# Patient Record
Sex: FEMALE | Race: WHITE | NOT HISPANIC OR LATINO
[De-identification: names, ages, dates, MRNs, and addresses within clinical notes are randomized per-mention and may not be internally consistent; named-entity substitution may affect disease eponyms.]

---

## 2022-01-26 ENCOUNTER — TRANSCRIPTION ENCOUNTER (OUTPATIENT)
Age: 22
End: 2022-01-26

## 2022-05-31 PROBLEM — Z00.00 ENCOUNTER FOR PREVENTIVE HEALTH EXAMINATION: Status: ACTIVE | Noted: 2022-05-31

## 2022-07-19 ENCOUNTER — APPOINTMENT (OUTPATIENT)
Dept: OBGYN | Facility: CLINIC | Age: 22
End: 2022-07-19

## 2022-07-24 ENCOUNTER — NON-APPOINTMENT (OUTPATIENT)
Age: 22
End: 2022-07-24

## 2022-09-06 ENCOUNTER — APPOINTMENT (OUTPATIENT)
Dept: DERMATOLOGY | Facility: CLINIC | Age: 22
End: 2022-09-06

## 2022-10-11 ENCOUNTER — APPOINTMENT (OUTPATIENT)
Dept: OBGYN | Facility: CLINIC | Age: 22
End: 2022-10-11

## 2022-11-28 ENCOUNTER — APPOINTMENT (OUTPATIENT)
Dept: OBGYN | Facility: CLINIC | Age: 22
End: 2022-11-28

## 2023-02-02 ENCOUNTER — APPOINTMENT (OUTPATIENT)
Dept: DERMATOLOGY | Facility: CLINIC | Age: 23
End: 2023-02-02
Payer: COMMERCIAL

## 2023-02-02 PROCEDURE — 99203 OFFICE O/P NEW LOW 30 MIN: CPT

## 2023-03-07 ENCOUNTER — APPOINTMENT (OUTPATIENT)
Dept: OBGYN | Facility: CLINIC | Age: 23
End: 2023-03-07
Payer: COMMERCIAL

## 2023-03-07 ENCOUNTER — NON-APPOINTMENT (OUTPATIENT)
Age: 23
End: 2023-03-07

## 2023-03-07 VITALS
BODY MASS INDEX: 21.9 KG/M2 | HEIGHT: 62 IN | WEIGHT: 119 LBS | DIASTOLIC BLOOD PRESSURE: 68 MMHG | SYSTOLIC BLOOD PRESSURE: 110 MMHG

## 2023-03-07 DIAGNOSIS — Z78.9 OTHER SPECIFIED HEALTH STATUS: ICD-10-CM

## 2023-03-07 DIAGNOSIS — N92.1 EXCESSIVE AND FREQUENT MENSTRUATION WITH IRREGULAR CYCLE: ICD-10-CM

## 2023-03-07 PROCEDURE — 99385 PREV VISIT NEW AGE 18-39: CPT

## 2023-03-07 NOTE — PLAN
[FreeTextEntry1] : 23-year-old female for well woman exam\par \par 1.  Pap done with GC cultures\par 2.  Self breast exam teaching was done\par 3.  History of hypermenorrhea and menorrhagia.  The patient is maintained on Tri-Lo-Sammi.  Rx was provided x1 year.  All risk, benefits and potential complications of combined OCPs were reviewed with the patient.\par 4.  Annual exam in 1 year

## 2023-03-07 NOTE — HISTORY OF PRESENT ILLNESS
[FreeTextEntry1] : 23-year-old female presents for well woman exam.  She is a new patient to our practice.  Her last GYN exam was 1 year ago.  She has no complaints today.  Menarche was at the age of 9.  The patient is taking Tri-Lo-Sammi for cycle regulation and symptom control.  Prior to OCPs her menses were every 14 days, lasting for 5 days.  She does have a history of heavy menses.  She also admits to mild cramping and nausea with her cycles.  The patient states when she was in eighth grade her menses were so heavy that she became anemic, passed out and required a blood transfusion.  She also had a D&C at this time.  She was started on OCPs and has not had an issue since.  She has no significant GYN history.  She is sexually active.\par \par She has no significant past medical history.  Past surgical history includes a D&C.  Family history is noncontributory.  Social history is negative x3.  GYN history as above.  Nulligravida.  She is allergic to penicillin.  She takes Tri-Lo-Sammi.

## 2023-03-10 LAB
C TRACH RRNA SPEC QL NAA+PROBE: NOT DETECTED
N GONORRHOEA RRNA SPEC QL NAA+PROBE: NOT DETECTED
SOURCE TP AMPLIFICATION: NORMAL

## 2023-03-13 LAB — CYTOLOGY CVX/VAG DOC THIN PREP: NORMAL

## 2023-05-09 ENCOUNTER — NON-APPOINTMENT (OUTPATIENT)
Age: 23
End: 2023-05-09

## 2024-01-04 ENCOUNTER — APPOINTMENT (OUTPATIENT)
Dept: OBGYN | Facility: CLINIC | Age: 24
End: 2024-01-04
Payer: COMMERCIAL

## 2024-01-04 ENCOUNTER — TRANSCRIPTION ENCOUNTER (OUTPATIENT)
Age: 24
End: 2024-01-04

## 2024-01-04 VITALS
BODY MASS INDEX: 22.45 KG/M2 | SYSTOLIC BLOOD PRESSURE: 130 MMHG | HEIGHT: 62 IN | DIASTOLIC BLOOD PRESSURE: 86 MMHG | WEIGHT: 122 LBS

## 2024-01-04 DIAGNOSIS — N92.6 IRREGULAR MENSTRUATION, UNSPECIFIED: ICD-10-CM

## 2024-01-04 LAB — HCG UR QL: NEGATIVE

## 2024-01-04 PROCEDURE — 81025 URINE PREGNANCY TEST: CPT

## 2024-01-04 PROCEDURE — 99214 OFFICE O/P EST MOD 30 MIN: CPT

## 2024-01-04 NOTE — DISCUSSION/SUMMARY
[FreeTextEntry1] : vag sono cbc,hcg continue trilomarzia, is in the placebo week today , can restart today  but prefderrs to start on sunday

## 2024-01-04 NOTE — HISTORY OF PRESENT ILLNESS
[FreeTextEntry1] : 22 yo here because she had  a medical termination took first pills dec 7 and than dec 8 the second she started bleeding on the 8th and has been on and off bleeding since. 2 days ago she was bleeding a pad an hour.  SHe has a hx at age 11 of having non stop bleeding and had a d and c  .  SHe also has on and off anemia.  SHe has been on trilomarzia for the last 5 yrs did continuous insept and got preg after that. no missed pills  [Currently Active] : currently active [Men] : men [Vaginal] : vaginal [No] : No

## 2024-01-04 NOTE — PHYSICAL EXAM
[Chaperone Declined] : Patient declined chaperone [Appropriately responsive] : appropriately responsive [Alert] : alert [No Acute Distress] : no acute distress [Soft] : soft [Oriented x3] : oriented x3 [Labia Majora] : normal [Labia Minora] : normal [Scant] : There was scant vaginal bleeding [Normal] : normal [Uterine Adnexae] : normal

## 2024-01-08 ENCOUNTER — ASOB RESULT (OUTPATIENT)
Age: 24
End: 2024-01-08

## 2024-01-08 ENCOUNTER — APPOINTMENT (OUTPATIENT)
Dept: OBGYN | Facility: CLINIC | Age: 24
End: 2024-01-08
Payer: COMMERCIAL

## 2024-01-08 ENCOUNTER — APPOINTMENT (OUTPATIENT)
Dept: ANTEPARTUM | Facility: CLINIC | Age: 24
End: 2024-01-08
Payer: COMMERCIAL

## 2024-01-08 VITALS — WEIGHT: 122 LBS | HEIGHT: 62 IN | BODY MASS INDEX: 22.45 KG/M2

## 2024-01-08 PROCEDURE — 99214 OFFICE O/P EST MOD 30 MIN: CPT

## 2024-01-08 PROCEDURE — 76830 TRANSVAGINAL US NON-OB: CPT

## 2024-01-08 PROCEDURE — 76856 US EXAM PELVIC COMPLETE: CPT | Mod: 59

## 2024-01-08 NOTE — HISTORY OF PRESENT ILLNESS
[FreeTextEntry1] : 24 yo here to go over pelvic sono results. She had a medical termination and had heavy bleeding after, last night it lightened up to spotting. On Tri lo kendrick. Todays sono shows endometrial lining measures 3.17 with heterogenous area measuring 2.0 x 1.9 x .7 cm with color flow, suggestive of retained products of conception

## 2024-01-09 ENCOUNTER — APPOINTMENT (OUTPATIENT)
Dept: OBGYN | Facility: CLINIC | Age: 24
End: 2024-01-09
Payer: COMMERCIAL

## 2024-01-09 VITALS
WEIGHT: 122 LBS | HEIGHT: 62 IN | DIASTOLIC BLOOD PRESSURE: 83 MMHG | SYSTOLIC BLOOD PRESSURE: 121 MMHG | BODY MASS INDEX: 22.45 KG/M2

## 2024-01-09 DIAGNOSIS — O07.4 FAILED ATTEMPTED TERMINATION OF PREGNANCY W/OUT COMPLICATION: ICD-10-CM

## 2024-01-09 LAB
BASOPHILS # BLD AUTO: 0.06 K/UL
BASOPHILS NFR BLD AUTO: 1 %
EOSINOPHIL # BLD AUTO: 0.05 K/UL
EOSINOPHIL NFR BLD AUTO: 0.9 %
HCG SERPL-MCNC: 26 MIU/ML
HCT VFR BLD CALC: 43 %
HGB BLD-MCNC: 14 G/DL
IMM GRANULOCYTES NFR BLD AUTO: 0.2 %
LYMPHOCYTES # BLD AUTO: 1.87 K/UL
LYMPHOCYTES NFR BLD AUTO: 32.2 %
MAN DIFF?: NORMAL
MCHC RBC-ENTMCNC: 30.1 PG
MCHC RBC-ENTMCNC: 32.6 GM/DL
MCV RBC AUTO: 92.5 FL
MONOCYTES # BLD AUTO: 0.4 K/UL
MONOCYTES NFR BLD AUTO: 6.9 %
NEUTROPHILS # BLD AUTO: 3.41 K/UL
NEUTROPHILS NFR BLD AUTO: 58.8 %
PLATELET # BLD AUTO: 276 K/UL
RBC # BLD: 4.65 M/UL
RBC # FLD: 13.2 %
WBC # FLD AUTO: 5.8 K/UL

## 2024-01-09 PROCEDURE — 99214 OFFICE O/P EST MOD 30 MIN: CPT | Mod: 25

## 2024-01-11 ENCOUNTER — TRANSCRIPTION ENCOUNTER (OUTPATIENT)
Age: 24
End: 2024-01-11

## 2024-01-16 LAB — HCG SERPL-MCNC: 4 MIU/ML

## 2024-01-17 ENCOUNTER — ASOB RESULT (OUTPATIENT)
Age: 24
End: 2024-01-17

## 2024-01-17 ENCOUNTER — APPOINTMENT (OUTPATIENT)
Dept: OBGYN | Facility: CLINIC | Age: 24
End: 2024-01-17
Payer: COMMERCIAL

## 2024-01-17 ENCOUNTER — APPOINTMENT (OUTPATIENT)
Dept: ANTEPARTUM | Facility: CLINIC | Age: 24
End: 2024-01-17
Payer: COMMERCIAL

## 2024-01-17 VITALS
SYSTOLIC BLOOD PRESSURE: 117 MMHG | HEIGHT: 62 IN | DIASTOLIC BLOOD PRESSURE: 78 MMHG | WEIGHT: 122 LBS | BODY MASS INDEX: 22.45 KG/M2

## 2024-01-17 DIAGNOSIS — Z30.09 ENCOUNTER FOR OTHER GENERAL COUNSELING AND ADVICE ON CONTRACEPTION: ICD-10-CM

## 2024-01-17 PROCEDURE — 99214 OFFICE O/P EST MOD 30 MIN: CPT | Mod: 25

## 2024-01-17 PROCEDURE — 76857 US EXAM PELVIC LIMITED: CPT | Mod: 59

## 2024-01-17 PROCEDURE — 76830 TRANSVAGINAL US NON-OB: CPT

## 2024-01-17 NOTE — PLAN
[FreeTextEntry1] : 23-year-old female with  1.  Suspected retained products of conception.  Patient had a repeat sonogram done today.  Sonogram showed an endometrium of 6 mm.  There was a heterogeneous area measuring 1.5 x 1.5 cm with color flow suggestive of retained products of conception.  We discussed that this could potentially be necrotic retained products of conception as her beta hCG is now negative.  We discussed treatment options including D&C versus observation with repeat sonogram in 2 months.  Patient elects for repeat sonogram in 2 months.  She is not interested in having surgery at this time.  Call parameters were reviewed.  2.  Sonogram today also shows a left ovarian cyst of 3.7 cm in size which is simple.  We discussed that there is a high likelihood of spontaneous resolution of simple cysts.  The patient is taking Tri-Lo-Sammi and we discussed at her last visit changing to a higher dose of estrogen.  She was given a prescription for Tri Keiko but did not yet started it as she has not finished her pill pack.  We discussed that as she got pregnant and is making cysts on the Tri-Lo-Sammi she should start to try Keiko as her next pill pack.  Call parameters were reviewed.  The patient will return to the office in 8 weeks for a OCP check, well woman exam, and sonogram to check on most likely necrotic retained products of conception.  Again, call parameters were reviewed.  The patient was given the opportunity to ask questions and all were answered to her satisfaction.

## 2024-01-17 NOTE — HISTORY OF PRESENT ILLNESS
[FreeTextEntry1] : 23-year-old female presents for follow up visit.  Patient was seen last week after a medical termination of pregnancy.  The patient had an episode of heavy bleeding.  She had a sonogram that showed potential retained products of conception measuring 2 x 2 cm.  She was also sent for a beta hCG which was found to be 26.  The patient was counseled on her options at the last visit, Cytotec versus surgery.  She was interested in taking Cytotec.  The patient took vaginal Cytotec and states she did not have any bleeding.  She did have passage of a small piece of tissue 2 days after the administration of Cytotec.  Patient is here today for a follow-up sonogram and blood work.  Patient had beta-hCG done and the result was negative.  She is still having some brown spotting, but no heavy bleeding.  She did get pregnant on Tri-Lo-Sammi.  Menarche was at the age of 9.  The patient is taking Tri-Lo-Sammi for cycle regulation and symptom control.  Prior to OCPs her menses were every 14 days, lasting for 5 days.  She does have a history of heavy menses.  She also admits to mild cramping and nausea with her cycles.  The patient states when she was in eighth grade her menses were so heavy that she became anemic, passed out and required a blood transfusion.  She also had a D&C at this time.  She was started on OCPs and has not had an issue since.  She has no significant GYN history.  She is sexually active.  She has no significant past medical history.  Past surgical history includes a D&C.  Family history is noncontributory.  Social history is negative x3.  GYN history as above.  Nulligravida.  She is allergic to penicillin.  She takes Tri-Lo-Sammi.

## 2024-02-27 ENCOUNTER — APPOINTMENT (OUTPATIENT)
Dept: OBGYN | Facility: CLINIC | Age: 24
End: 2024-02-27

## 2024-03-13 ENCOUNTER — APPOINTMENT (OUTPATIENT)
Dept: ANTEPARTUM | Facility: CLINIC | Age: 24
End: 2024-03-13
Payer: COMMERCIAL

## 2024-03-13 ENCOUNTER — APPOINTMENT (OUTPATIENT)
Dept: OBGYN | Facility: CLINIC | Age: 24
End: 2024-03-13
Payer: COMMERCIAL

## 2024-03-13 ENCOUNTER — ASOB RESULT (OUTPATIENT)
Age: 24
End: 2024-03-13

## 2024-03-13 VITALS
BODY MASS INDEX: 23.19 KG/M2 | HEIGHT: 62 IN | WEIGHT: 126 LBS | SYSTOLIC BLOOD PRESSURE: 128 MMHG | DIASTOLIC BLOOD PRESSURE: 81 MMHG

## 2024-03-13 DIAGNOSIS — N83.209 UNSPECIFIED OVARIAN CYST, UNSPECIFIED SIDE: ICD-10-CM

## 2024-03-13 DIAGNOSIS — Z30.41 ENCOUNTER FOR SURVEILLANCE OF CONTRACEPTIVE PILLS: ICD-10-CM

## 2024-03-13 DIAGNOSIS — Z01.419 ENCOUNTER FOR GYNECOLOGICAL EXAMINATION (GENERAL) (ROUTINE) W/OUT ABNORMAL FINDINGS: ICD-10-CM

## 2024-03-13 PROCEDURE — 76830 TRANSVAGINAL US NON-OB: CPT

## 2024-03-13 PROCEDURE — 76856 US EXAM PELVIC COMPLETE: CPT | Mod: 59

## 2024-03-13 PROCEDURE — 99214 OFFICE O/P EST MOD 30 MIN: CPT | Mod: 25

## 2024-03-13 PROCEDURE — 99395 PREV VISIT EST AGE 18-39: CPT

## 2024-03-13 RX ORDER — NORGESTIMATE AND ETHINYL ESTRADIOL 7DAYSX3 LO
0.18/0.215/0.25 KIT ORAL DAILY
Qty: 3 | Refills: 1 | Status: COMPLETED | COMMUNITY
Start: 2023-03-07 | End: 2024-03-13

## 2024-03-13 RX ORDER — MISOPROSTOL 200 UG/1
200 TABLET ORAL
Qty: 8 | Refills: 0 | Status: COMPLETED | COMMUNITY
Start: 2024-01-09 | End: 2024-03-13

## 2024-03-13 RX ORDER — NORGESTIMATE AND ETHINYL ESTRADIOL 7DAYSX3 28
0.18/0.215/0.25 KIT ORAL DAILY
Qty: 3 | Refills: 3 | Status: ACTIVE | COMMUNITY
Start: 2024-01-09 | End: 1900-01-01

## 2024-03-13 NOTE — PHYSICAL EXAM
[Chaperone Declined] : Patient declined chaperone [Appropriately responsive] : appropriately responsive [Alert] : alert [No Acute Distress] : no acute distress [No Lymphadenopathy] : no lymphadenopathy [No Murmurs] : no murmurs [Regular Rate Rhythm] : regular rate rhythm [Clear to Auscultation B/L] : clear to auscultation bilaterally [Non-tender] : non-tender [Soft] : soft [Non-distended] : non-distended [No HSM] : No HSM [No Lesions] : no lesions [No Mass] : no mass [Oriented x3] : oriented x3 [Examination Of The Breasts] : a normal appearance [No Masses] : no breast masses were palpable [Labia Majora] : normal [Labia Minora] : normal [Uterine Adnexae] : normal [Normal] : normal

## 2024-03-13 NOTE — PLAN
[FreeTextEntry1] : 24-year-old female for well woman exam  1.  Pap done with GC cultures 2.  Self breast exam teaching was done 3.  History of hypermenorrhea and menorrhagia.  The patient got pregnant on Tri-Lo-Sammi.  She has been on Tri-Sprintec now for 8 weeks.  Overall, she is happy with the pill.  The patient has noticed more hormonal acne with this pill but would like to continue for now.  She states if after 6 months she is still having hormonal acne she will return to the office to discuss other OCP options.  We discussed starting Ocella if acne continue on Tri-Sprintec.  Rx was provided for Tri-Sprintec x 1 year.  All risk, benefits and potential complications of combined OCPs were reviewed with the patient. 4.  History of medical termination of pregnancy with suspected necrotic retained products of conception.  Sonogram was performed today.  Area of retained products of conception no longer visualized.  Patient was counseled.  Reassurance was provided. 5.  History of simple left ovarian cyst.  Sonogram was performed today.  Cyst resolved. 6.  Annual exam in 1 year

## 2024-03-13 NOTE — HISTORY OF PRESENT ILLNESS
[FreeTextEntry1] : 24-year-old female presents for well woman exam, OCP check and sono results.  Menarche was at the age of 9.  The patient is taking Tri-Sprintecfor cycle regulation and symptom control.  Prior to OCPs her menses were every 14 days, lasting for 5 days.  She does have a history of heavy menses.  She also admits to mild cramping and nausea with her cycles.  The patient states when she was in eighth grade her menses were so heavy that she became anemic, passed out and required a blood transfusion.  She also had a D&C at this time.  She was started on OCPs and has not had an issue since.  She has been on the Tri-Sprintec for 8 weeks as she got pregnant on Tri-Lo-Sammi.  She states overall she is happy with the Tri-Sprintec but has noticed an increase in her acne.  She is not interested in changing her pill at this time.  She would like to stay on this pill for 6 months to see if her acne improves before changing OCPs.  Patient is also here for a sonogram today.  The patient had a medical termination of pregnancy in 2024.  The patient had a sonogram that showed potential retained products of conception measuring 2 x 2 cm in size.  Her last beta-hCG was negative.  She took Cytotec but did not have any bleeding.  She did pass a small amount of tissue 2 days after the Cytotec.  She had a follow-up sonogram that showed a 1.5 x 1.5 cm endometrial mass suggestive of retained products of conception.  We had discussed at this time that this could be necrotic retained products of conception.  Sonogram also showed a 3.7 cm left simple ovarian cyst.  She denies any pelvic pain or pressure.  She denies any intermenstrual bleeding.  She has no significant GYN history.  She is sexually active.  She has no significant past medical history.  Past surgical history includes a D&C.  Family history is noncontributory.  Social history is negative x3.  GYN history as above.   (medical etop).  She is allergic to penicillin.  She takes Tri-Sprintec.

## 2024-03-18 PROBLEM — O07.4 RETAINED PRODUCTS OF CONCEPTION AFTER INDUCED TERMINATION OF PREGNANCY: Status: ACTIVE | Noted: 2024-01-08

## 2024-03-18 LAB — CYTOLOGY CVX/VAG DOC THIN PREP: NORMAL

## 2024-03-18 NOTE — PLAN
[FreeTextEntry1] : 24-year-old female with  1.  History of medical termination of pregnancy with possible retained products of conception.  Blood work and sonogram were reviewed with the patient.  Patient is aware that she is not anemic.  Beta hCG was 26.  Sonogram showing 2 cm heterogeneous area in the uterus suggestive of retained products of conception.  The patient has not had any further heavy bleeding.  We discussed treatment options including observation versus medical treatment with Cytotec versus D&C.  All risk, benefits and potential complications of each option were reviewed carefully with the patient.  The patient is interested in taking Cytotec.  Rx was provided for Cytotec 800 mcg to be used vaginally every 3 hours for 2 doses as needed.  Patient is aware that she can get bleeding after administration of Cytotec.  Strict call parameters were reviewed.  We also discussed repeating her beta-hCG in 1 week with return to the office for a transvaginal sonogram and visit to see if the retained products of conception have passed.  Patient is aware that she can take NSAIDs as needed for pain.  2.  We addressed the fact that the patient to get pregnant on Tri-Lo-Sammi.  The patient was not missing any pills or taking them late.  Discussed with the patient that she may need a higher dose of estrogen.  She is interested in resuming combined OCPs with her next menstrual cycle.  Rx was given for Tri-Sprintec x 3 months.  All risk, benefits and potential complications of combined OCPs were reviewed with the patient.  She will start this medication with her next menstrual cycle.  Instructions were reviewed.  Counseling was given.  The patient was given the opportunity to ask questions and all were answered to her satisfaction.

## 2024-03-18 NOTE — HISTORY OF PRESENT ILLNESS
[FreeTextEntry1] : 23-year-old female presents for follow up visit.  Patient was taking Tri-Lo-Sammi for contraception and got pregnant on the pill.  The patient had an elective termination of pregnancy with [facility].  The patient had a sonogram prior to taking medication showing an intrauterine pregnancy measuring 6+ weeks.  Patient took the medication in December 7 and December 8.  The patient did have heavy bleeding which then turned into spotting.  Patient presented to our office on January fourth for an episode of heavy vaginal bleeding.  She states send she is just had spotting.  She denies any symptoms of anemia.  The patient had a CBC which was within normal limits.  Beta hCG was drawn which was 26.  She had a transvaginal sonogram that showed a millimeter endometrial lining with 2 cm intrauterine vascularity which could be consistent with retained products of conception.  She is here today to discuss treatment options.  She denies any further episodes of heavy vaginal bleeding.  She denies cramping.  Menarche was at the age of 9.  Prior to OCPs her menses were every 14 days, lasting for 5 days.  She does have a history of heavy menses.  She also admits to mild cramping and nausea with her cycles.  The patient states when she was in eighth grade her menses were so heavy that she became anemic, passed out and required a blood transfusion.  She also had a D&C at this time.  She was started on OCPs and has not had an issue since.  She has no significant GYN history.  She is sexually active.  She has no significant past medical history.  Past surgical history includes a D&C.  Family history is noncontributory.  Social history is negative x3.  GYN history as above.  Nulligravida.  She is allergic to penicillin.  She takes Tri-Lo-Sammi.

## 2024-03-18 NOTE — PHYSICAL EXAM
[Chaperone Declined] : Patient declined chaperone [Appropriately responsive] : appropriately responsive [Alert] : alert [No Acute Distress] : no acute distress [Soft] : soft [Non-tender] : non-tender [Non-distended] : non-distended [No HSM] : No HSM [No Lesions] : no lesions [No Mass] : no mass [Oriented x3] : oriented x3 [Labia Majora] : normal [Labia Minora] : normal [Uterine Adnexae] : normal [Normal] : normal

## 2024-03-19 ENCOUNTER — APPOINTMENT (OUTPATIENT)
Dept: OBGYN | Facility: CLINIC | Age: 24
End: 2024-03-19

## 2024-08-08 ENCOUNTER — APPOINTMENT (OUTPATIENT)
Dept: DERMATOLOGY | Facility: CLINIC | Age: 24
End: 2024-08-08

## 2025-06-03 ENCOUNTER — NON-APPOINTMENT (OUTPATIENT)
Age: 25
End: 2025-06-03

## 2025-06-05 ENCOUNTER — APPOINTMENT (OUTPATIENT)
Dept: DERMATOLOGY | Facility: CLINIC | Age: 25
End: 2025-06-05
Payer: COMMERCIAL

## 2025-06-05 PROCEDURE — 99214 OFFICE O/P EST MOD 30 MIN: CPT | Mod: 25

## 2025-06-05 PROCEDURE — 11102 TANGNTL BX SKIN SINGLE LES: CPT

## 2025-08-05 ENCOUNTER — APPOINTMENT (OUTPATIENT)
Dept: OBGYN | Facility: CLINIC | Age: 25
End: 2025-08-05
Payer: COMMERCIAL

## 2025-08-05 VITALS
SYSTOLIC BLOOD PRESSURE: 129 MMHG | WEIGHT: 120 LBS | HEIGHT: 62 IN | DIASTOLIC BLOOD PRESSURE: 79 MMHG | BODY MASS INDEX: 22.08 KG/M2

## 2025-08-05 DIAGNOSIS — Z01.419 ENCOUNTER FOR GYNECOLOGICAL EXAMINATION (GENERAL) (ROUTINE) W/OUT ABNORMAL FINDINGS: ICD-10-CM

## 2025-08-05 PROCEDURE — 99395 PREV VISIT EST AGE 18-39: CPT

## 2025-08-11 LAB — CYTOLOGY CVX/VAG DOC THIN PREP: NORMAL

## 2025-08-25 ENCOUNTER — NON-APPOINTMENT (OUTPATIENT)
Age: 25
End: 2025-08-25

## 2025-08-26 ENCOUNTER — RESULT REVIEW (OUTPATIENT)
Age: 25
End: 2025-08-26

## 2025-08-26 ENCOUNTER — APPOINTMENT (OUTPATIENT)
Dept: DERMATOLOGY | Facility: CLINIC | Age: 25
End: 2025-08-26

## 2025-08-26 DIAGNOSIS — D22.9 MELANOCYTIC NEVI, UNSPECIFIED: ICD-10-CM

## 2025-08-26 PROCEDURE — 12032 INTMD RPR S/A/T/EXT 2.6-7.5: CPT

## 2025-08-26 PROCEDURE — 11402 EXC TR-EXT B9+MARG 1.1-2 CM: CPT

## 2025-08-26 RX ORDER — MUPIROCIN 20 MG/G
2 OINTMENT TOPICAL
Qty: 1 | Refills: 2 | Status: ACTIVE | COMMUNITY
Start: 2025-08-26 | End: 1900-01-01

## 2025-09-08 ENCOUNTER — NON-APPOINTMENT (OUTPATIENT)
Age: 25
End: 2025-09-08